# Patient Record
Sex: MALE | Race: OTHER | Employment: FULL TIME | URBAN - METROPOLITAN AREA
[De-identification: names, ages, dates, MRNs, and addresses within clinical notes are randomized per-mention and may not be internally consistent; named-entity substitution may affect disease eponyms.]

---

## 2023-09-27 ENCOUNTER — OFFICE VISIT (OUTPATIENT)
Dept: INTERNAL MEDICINE CLINIC | Facility: CLINIC | Age: 28
End: 2023-09-27
Payer: COMMERCIAL

## 2023-09-27 VITALS
BODY MASS INDEX: 29.7 KG/M2 | OXYGEN SATURATION: 96 % | TEMPERATURE: 98 F | HEART RATE: 55 BPM | SYSTOLIC BLOOD PRESSURE: 100 MMHG | HEIGHT: 68 IN | RESPIRATION RATE: 14 BRPM | DIASTOLIC BLOOD PRESSURE: 60 MMHG | WEIGHT: 196 LBS

## 2023-09-27 DIAGNOSIS — K21.9 GASTROESOPHAGEAL REFLUX DISEASE, UNSPECIFIED WHETHER ESOPHAGITIS PRESENT: ICD-10-CM

## 2023-09-27 DIAGNOSIS — H91.90 SUBJECTIVE HEARING CHANGE: ICD-10-CM

## 2023-09-27 DIAGNOSIS — Z00.00 ANNUAL PHYSICAL EXAM: Primary | ICD-10-CM

## 2023-09-27 PROCEDURE — 3078F DIAST BP <80 MM HG: CPT | Performed by: INTERNAL MEDICINE

## 2023-09-27 PROCEDURE — 3074F SYST BP LT 130 MM HG: CPT | Performed by: INTERNAL MEDICINE

## 2023-09-27 PROCEDURE — 99385 PREV VISIT NEW AGE 18-39: CPT | Performed by: INTERNAL MEDICINE

## 2023-09-27 PROCEDURE — 3008F BODY MASS INDEX DOCD: CPT | Performed by: INTERNAL MEDICINE

## 2023-09-27 PROCEDURE — 99203 OFFICE O/P NEW LOW 30 MIN: CPT | Performed by: INTERNAL MEDICINE

## 2023-09-27 RX ORDER — ZINC GLUCONATE 50 MG
TABLET ORAL
COMMUNITY

## 2023-09-27 RX ORDER — MULTIVIT-MIN/FERROUS FUMARATE 9 MG/15 ML
LIQUID (ML) ORAL
COMMUNITY

## 2023-10-24 ENCOUNTER — OFFICE VISIT (OUTPATIENT)
Dept: FAMILY MEDICINE CLINIC | Facility: CLINIC | Age: 28
End: 2023-10-24

## 2023-10-24 VITALS
WEIGHT: 198 LBS | DIASTOLIC BLOOD PRESSURE: 68 MMHG | SYSTOLIC BLOOD PRESSURE: 128 MMHG | RESPIRATION RATE: 16 BRPM | TEMPERATURE: 97 F | OXYGEN SATURATION: 97 % | BODY MASS INDEX: 30.01 KG/M2 | HEART RATE: 56 BPM | HEIGHT: 68 IN

## 2023-10-24 DIAGNOSIS — J98.8 VIRAL RESPIRATORY INFECTION: Primary | ICD-10-CM

## 2023-10-24 DIAGNOSIS — Z28.21 INFLUENZA VACCINATION DECLINED BY PATIENT: ICD-10-CM

## 2023-10-24 DIAGNOSIS — B97.89 VIRAL RESPIRATORY INFECTION: Primary | ICD-10-CM

## 2023-10-24 PROCEDURE — 3008F BODY MASS INDEX DOCD: CPT | Performed by: STUDENT IN AN ORGANIZED HEALTH CARE EDUCATION/TRAINING PROGRAM

## 2023-10-24 PROCEDURE — 3074F SYST BP LT 130 MM HG: CPT | Performed by: STUDENT IN AN ORGANIZED HEALTH CARE EDUCATION/TRAINING PROGRAM

## 2023-10-24 PROCEDURE — 99213 OFFICE O/P EST LOW 20 MIN: CPT | Performed by: STUDENT IN AN ORGANIZED HEALTH CARE EDUCATION/TRAINING PROGRAM

## 2023-10-24 PROCEDURE — 3078F DIAST BP <80 MM HG: CPT | Performed by: STUDENT IN AN ORGANIZED HEALTH CARE EDUCATION/TRAINING PROGRAM

## 2023-10-24 RX ORDER — BENZONATATE 100 MG/1
100 CAPSULE ORAL 3 TIMES DAILY PRN
Qty: 30 CAPSULE | Refills: 0 | Status: SHIPPED | OUTPATIENT
Start: 2023-10-24 | End: 2023-11-03

## 2023-12-04 ENCOUNTER — PATIENT MESSAGE (OUTPATIENT)
Dept: INTERNAL MEDICINE CLINIC | Facility: CLINIC | Age: 28
End: 2023-12-04

## 2023-12-05 NOTE — TELEPHONE ENCOUNTER
From: Jim Thompson  To: Stella Ferreira  Sent: 12/4/2023 7:04 PM CST  Subject: Vitamin D Test Follow UP    Hi,     I was suppose to take a test back in 9/27/23 to see my levels for Vitamin D. Is it too late to take the test for a follow up? If I do need to take it, what kind of test is it and where can I go? Thanks in advance.

## 2023-12-06 ENCOUNTER — LAB ENCOUNTER (OUTPATIENT)
Dept: LAB | Age: 28
End: 2023-12-06
Attending: INTERNAL MEDICINE
Payer: COMMERCIAL

## 2023-12-06 DIAGNOSIS — Z00.00 ANNUAL PHYSICAL EXAM: ICD-10-CM

## 2023-12-06 LAB
ALBUMIN SERPL-MCNC: 4 G/DL (ref 3.4–5)
ALBUMIN/GLOB SERPL: 1.2 {RATIO} (ref 1–2)
ALP LIVER SERPL-CCNC: 77 U/L
ALT SERPL-CCNC: 35 U/L
ANION GAP SERPL CALC-SCNC: 6 MMOL/L (ref 0–18)
AST SERPL-CCNC: 25 U/L (ref 15–37)
BASOPHILS # BLD AUTO: 0.03 X10(3) UL (ref 0–0.2)
BASOPHILS NFR BLD AUTO: 0.5 %
BILIRUB SERPL-MCNC: 0.7 MG/DL (ref 0.1–2)
BUN BLD-MCNC: 19 MG/DL (ref 9–23)
CALCIUM BLD-MCNC: 9.1 MG/DL (ref 8.5–10.1)
CHLORIDE SERPL-SCNC: 106 MMOL/L (ref 98–112)
CHOLEST SERPL-MCNC: 157 MG/DL (ref ?–200)
CO2 SERPL-SCNC: 29 MMOL/L (ref 21–32)
CREAT BLD-MCNC: 1.55 MG/DL
EGFRCR SERPLBLD CKD-EPI 2021: 61 ML/MIN/1.73M2 (ref 60–?)
EOSINOPHIL # BLD AUTO: 0.43 X10(3) UL (ref 0–0.7)
EOSINOPHIL NFR BLD AUTO: 7.3 %
ERYTHROCYTE [DISTWIDTH] IN BLOOD BY AUTOMATED COUNT: 12.8 %
EST. AVERAGE GLUCOSE BLD GHB EST-MCNC: 111 MG/DL (ref 68–126)
FASTING PATIENT LIPID ANSWER: NO
FASTING STATUS PATIENT QL REPORTED: NO
GLOBULIN PLAS-MCNC: 3.4 G/DL (ref 2.8–4.4)
GLUCOSE BLD-MCNC: 95 MG/DL (ref 70–99)
HBA1C MFR BLD: 5.5 % (ref ?–5.7)
HCT VFR BLD AUTO: 44.4 %
HDLC SERPL-MCNC: 55 MG/DL (ref 40–59)
HGB BLD-MCNC: 15 G/DL
IMM GRANULOCYTES # BLD AUTO: 0.04 X10(3) UL (ref 0–1)
IMM GRANULOCYTES NFR BLD: 0.7 %
LDLC SERPL CALC-MCNC: 87 MG/DL (ref ?–100)
LYMPHOCYTES # BLD AUTO: 2.49 X10(3) UL (ref 1–4)
LYMPHOCYTES NFR BLD AUTO: 42.2 %
MCH RBC QN AUTO: 28.9 PG (ref 26–34)
MCHC RBC AUTO-ENTMCNC: 33.8 G/DL (ref 31–37)
MCV RBC AUTO: 85.5 FL
MONOCYTES # BLD AUTO: 0.54 X10(3) UL (ref 0.1–1)
MONOCYTES NFR BLD AUTO: 9.2 %
NEUTROPHILS # BLD AUTO: 2.37 X10 (3) UL (ref 1.5–7.7)
NEUTROPHILS # BLD AUTO: 2.37 X10(3) UL (ref 1.5–7.7)
NEUTROPHILS NFR BLD AUTO: 40.1 %
NONHDLC SERPL-MCNC: 102 MG/DL (ref ?–130)
OSMOLALITY SERPL CALC.SUM OF ELEC: 294 MOSM/KG (ref 275–295)
PLATELET # BLD AUTO: 220 10(3)UL (ref 150–450)
POTASSIUM SERPL-SCNC: 3.8 MMOL/L (ref 3.5–5.1)
PROT SERPL-MCNC: 7.4 G/DL (ref 6.4–8.2)
RBC # BLD AUTO: 5.19 X10(6)UL
SODIUM SERPL-SCNC: 141 MMOL/L (ref 136–145)
TRIGL SERPL-MCNC: 79 MG/DL (ref 30–149)
TSI SER-ACNC: 2.16 MIU/ML (ref 0.36–3.74)
VIT D+METAB SERPL-MCNC: 22.4 NG/ML (ref 30–100)
VLDLC SERPL CALC-MCNC: 13 MG/DL (ref 0–30)
WBC # BLD AUTO: 5.9 X10(3) UL (ref 4–11)

## 2023-12-06 PROCEDURE — 36415 COLL VENOUS BLD VENIPUNCTURE: CPT

## 2023-12-06 PROCEDURE — 82306 VITAMIN D 25 HYDROXY: CPT

## 2023-12-07 DIAGNOSIS — R79.89 ELEVATED SERUM CREATININE: Primary | ICD-10-CM

## 2024-02-01 ENCOUNTER — E-VISIT (OUTPATIENT)
Dept: TELEHEALTH | Age: 29
End: 2024-02-01
Payer: COMMERCIAL

## 2024-02-01 ENCOUNTER — OFFICE VISIT (OUTPATIENT)
Dept: FAMILY MEDICINE CLINIC | Facility: CLINIC | Age: 29
End: 2024-02-01
Payer: COMMERCIAL

## 2024-02-01 VITALS
WEIGHT: 194.5 LBS | HEIGHT: 68 IN | DIASTOLIC BLOOD PRESSURE: 60 MMHG | TEMPERATURE: 97 F | BODY MASS INDEX: 29.48 KG/M2 | OXYGEN SATURATION: 98 % | SYSTOLIC BLOOD PRESSURE: 120 MMHG | RESPIRATION RATE: 16 BRPM | HEART RATE: 60 BPM

## 2024-02-01 DIAGNOSIS — A09 TRAVELER'S DIARRHEA: Primary | ICD-10-CM

## 2024-02-01 DIAGNOSIS — Z02.9 ADMINISTRATIVE ENCOUNTER: Primary | ICD-10-CM

## 2024-02-01 DIAGNOSIS — K62.5 BRBPR (BRIGHT RED BLOOD PER RECTUM): ICD-10-CM

## 2024-02-01 DIAGNOSIS — K21.9 GASTROESOPHAGEAL REFLUX DISEASE, UNSPECIFIED WHETHER ESOPHAGITIS PRESENT: ICD-10-CM

## 2024-02-01 PROCEDURE — 99214 OFFICE O/P EST MOD 30 MIN: CPT | Performed by: STUDENT IN AN ORGANIZED HEALTH CARE EDUCATION/TRAINING PROGRAM

## 2024-02-01 RX ORDER — NICOTINE POLACRILEX 4 MG/1
1 GUM, CHEWING ORAL DAILY
Qty: 60 TABLET | Refills: 0 | Status: SHIPPED | OUTPATIENT
Start: 2024-02-01 | End: 2024-04-01

## 2024-02-01 RX ORDER — LOPERAMIDE HYDROCHLORIDE 2 MG/1
2 TABLET ORAL 4 TIMES DAILY PRN
Qty: 30 TABLET | Refills: 0 | Status: SHIPPED | OUTPATIENT
Start: 2024-02-01

## 2024-02-01 RX ORDER — AZITHROMYCIN 250 MG/1
500 TABLET, FILM COATED ORAL DAILY
Qty: 6 TABLET | Refills: 0 | Status: SHIPPED | OUTPATIENT
Start: 2024-02-01 | End: 2024-02-04

## 2024-02-01 NOTE — PROGRESS NOTES
Subjective:      Chief Complaint   Patient presents with    Nausea     Upset stomach, minor chills, vomiting, diarrhea with mucus - started Monday - states he just came back from [place] - took peptobismol     HISTORY OF PRESENT ILLNESS  Nausea  Associated symptoms include abdominal pain, nausea and vomiting.     HPI obtained per patient report.  Armando Victor is a pleasant 28 year old male presenting with GI symptoms.   He reports epigastric pain, bloating, and multiple episodes of nonbloody nonmucoid diarrhea since 1/29/24. He initially had nausea and vomiting on 1/29/24 and 1/30/24 but these symptoms have resolved. His symptoms began during a trip to [place], and he returned yesterday. He has been taking peptobismol as needed for his symptoms, but his diarrhea has not been improving.     He also reports chronic intermittent heartburn when eating spicy or greasy foods. He has cut back on his dietary triggers as much as possible, but he still has symptoms more than twice per week due to inability to avoid these foods entirely. He reports occasional BRBPR after eating spicy foods as well.     PAST PATIENT HISTORY  Past Medical History:   Diagnosis Date    Allergic rhinitis 6/1/2000    Esophageal reflux 6/1/2015     History reviewed. No pertinent surgical history.    CURRENT MEDICATIONS  Outpatient Medications Marked as Taking for the 2/1/24 encounter (Office Visit) with Cecilia Camejo MD   Medication Sig Dispense Refill    azithromycin 250 MG Oral Tab Take 2 tablets (500 mg total) by mouth daily for 3 days. 6 tablet 0    Loperamide HCl 2 MG Oral Tab Take 1 tablet (2 mg total) by mouth 4 (four) times daily as needed for Diarrhea. 30 tablet 0    Omeprazole 20 MG Oral Tab EC Take 1 tablet by mouth daily. 60 tablet 0    ASHWAGANDHA OR Take 1,000 mg by mouth.      Cholecalciferol (VITAMIN D3) 30 MCG/15ML Oral Liquid Take by mouth.      Zinc 50 MG Oral Tab Take by mouth.      KRILL OIL OR Take by mouth.      B Complex  Vitamins (B COMPLEX 1 OR) Take by mouth.      Chlorpheniramine Maleate (ALLERGY OR) Take by mouth.         HEALTH MAINTENANCE  Immunization History   Administered Date(s) Administered    None   Deferred Date(s) Deferred    FLUZONE 6 months and older PFS 0.5 ml (51730) 10/24/2023       ALLERGIES AND DRUG REACTIONS  Allergies   Allergen Reactions    Tylenol [Acetaminophen] HIVES       Family History   Problem Relation Age of Onset    Colon Cancer Neg     Breast Cancer Neg     Ovarian Cancer Neg      Social History     Socioeconomic History    Marital status:    Tobacco Use    Smoking status: Never    Smokeless tobacco: Never   Vaping Use    Vaping Use: Never used   Substance and Sexual Activity    Alcohol use: Yes     Alcohol/week: 1.0 standard drink of alcohol     Types: 1 Shots of liquor per week     Comment: minor per patient    Drug use: Yes     Frequency: 2.0 times per week     Types: Cannabis   Other Topics Concern    Caffeine Concern No    Exercise Yes    Seat Belt No    Special Diet Yes    Stress Concern No    Weight Concern No       Review of Systems   Gastrointestinal:  Positive for abdominal distention, abdominal pain, blood in stool, diarrhea, nausea and vomiting.   All other systems reviewed and are negative.         Objective:      /60   Pulse 60   Temp 97.2 °F (36.2 °C) (Temporal)   Resp 16   Ht 5' 8\" (1.727 m)   Wt 194 lb 8 oz (88.2 kg)   SpO2 98%   BMI 29.57 kg/m²   Body mass index is 29.57 kg/m².    Physical Exam  Vitals reviewed.   Constitutional:       General: He is not in acute distress.     Appearance: He is not ill-appearing, toxic-appearing or diaphoretic.   HENT:      Head: Normocephalic and atraumatic.   Cardiovascular:      Rate and Rhythm: Normal rate.   Pulmonary:      Effort: Pulmonary effort is normal.   Abdominal:      General: Abdomen is flat. Bowel sounds are normal. There is no distension.      Palpations: Abdomen is soft. There is no mass.      Tenderness: There  is no abdominal tenderness. There is no guarding or rebound.      Hernia: No hernia is present.   Musculoskeletal:      Cervical back: Neck supple.      Right lower leg: No edema.      Left lower leg: No edema.   Neurological:      Mental Status: He is alert and oriented to person, place, and time.            Assessment and Plan:      1. Traveler's diarrhea (Primary)  -     Azithromycin; Take 2 tablets (500 mg total) by mouth daily for 3 days.  Dispense: 6 tablet; Refill: 0  -     Loperamide HCl; Take 1 tablet (2 mg total) by mouth 4 (four) times daily as needed for Diarrhea.  Dispense: 30 tablet; Refill: 0  2. Gastroesophageal reflux disease, unspecified whether esophagitis present  -     Omeprazole; Take 1 tablet by mouth daily.  Dispense: 60 tablet; Refill: 0  -     GASTRO - INTERNAL  3. BRBPR (bright red blood per rectum)    No follow-ups on file.    Traveler's diarrhea  - diarrhea not improving over time  - recommended azithromycin as prescribed   - discussed that he may take peptobismol and/or imodium as needed for diarrhea    GERD, BRBPR  - possible associated gastritis/PUD  - recommended starting omeprazole as prescribed after resolution of his acute symptoms above  - we discussed that if omeprazole 20mg is insufficient for symptom control, he may take 40mg daily  - if symptoms do not resolve with 8 week course of omeprazole, recommended consulting with GI    Patient verbalized understanding of assessment and recommendations. All questions and concerns were addressed.    Electronically signed by Cecilia Camejo MD

## 2024-02-01 NOTE — PROGRESS NOTES
Patient has appt with PCP this after and requested to cancel this Evisit. Will close out Evisit at no charge to patient.

## 2024-02-13 ENCOUNTER — E-VISIT (OUTPATIENT)
Dept: TELEHEALTH | Age: 29
End: 2024-02-13
Payer: COMMERCIAL

## 2024-02-13 DIAGNOSIS — T50.905A ADVERSE EFFECT OF DRUG, INITIAL ENCOUNTER: ICD-10-CM

## 2024-02-13 DIAGNOSIS — R21 RASH: Primary | ICD-10-CM

## 2024-02-13 PROCEDURE — 99422 OL DIG E/M SVC 11-20 MIN: CPT | Performed by: PHYSICIAN ASSISTANT

## 2024-02-13 NOTE — PROGRESS NOTES
Armando Victor is a 28 year old male who initiated e-visit care today.    HPI:   See answers to questionnaire submission     Current Outpatient Medications   Medication Sig Dispense Refill    Loperamide HCl 2 MG Oral Tab Take 1 tablet (2 mg total) by mouth 4 (four) times daily as needed for Diarrhea. 30 tablet 0    Omeprazole 20 MG Oral Tab EC Take 1 tablet by mouth daily. 60 tablet 0    ASHWAGANDHA OR Take 1,000 mg by mouth.      Cholecalciferol (VITAMIN D3) 30 MCG/15ML Oral Liquid Take by mouth.      Zinc 50 MG Oral Tab Take by mouth.      KRILL OIL OR Take by mouth.      B Complex Vitamins (B COMPLEX 1 OR) Take by mouth.      Chlorpheniramine Maleate (ALLERGY OR) Take by mouth.        Past Medical History:   Diagnosis Date    Allergic rhinitis 6/1/2000    Esophageal reflux 6/1/2015      No past surgical history on file.   Family History   Problem Relation Age of Onset    Colon Cancer Neg     Breast Cancer Neg     Ovarian Cancer Neg       Social History:  Social History     Socioeconomic History    Marital status:    Tobacco Use    Smoking status: Never    Smokeless tobacco: Never   Vaping Use    Vaping Use: Never used   Substance and Sexual Activity    Alcohol use: Yes     Alcohol/week: 1.0 standard drink of alcohol     Types: 1 Shots of liquor per week     Comment: minor per patient    Drug use: Yes     Frequency: 2.0 times per week     Types: Cannabis   Other Topics Concern    Caffeine Concern No    Exercise Yes    Seat Belt No    Special Diet Yes    Stress Concern No    Weight Concern No         ASSESSMENT AND PLAN:       Encounter Diagnoses   Name Primary?    Rash Yes    Adverse effect of drug, initial encounter      Patient with reported hives. Patient did not sent photo despite request as \"was unable to take\" photos at that time.  Patient reports hx of hives with acetaminophen, listed as possible allergy on chart.  Patient reports taking Dayquil XL-3 and acetaminophen recently.  Now having possible  hives.  Denies tongue/facial swelling, denies SOB/dyspnea/wheezing/dysphagia, denies NV.  Advised avoidance.  Counseled on seeking care emergently at ED for any of the above symptoms.     Advised Zyrtec/Xyzal BID and Pepcid BID for next several days.       Duration of  the service:  18 minutes      See [employer] message exchange and Patient Instructions for Comfort Care and patient education.

## 2024-10-21 ENCOUNTER — OFFICE VISIT (OUTPATIENT)
Facility: LOCATION | Age: 29
End: 2024-10-21
Payer: COMMERCIAL

## 2024-10-21 DIAGNOSIS — F52.4 PREMATURE EJACULATION: ICD-10-CM

## 2024-10-21 DIAGNOSIS — L81.9 HYPOPIGMENTATION: ICD-10-CM

## 2024-10-21 DIAGNOSIS — Q55.69 SHORT FRENULUM OF PENIS: ICD-10-CM

## 2024-10-21 DIAGNOSIS — N52.9 ERECTILE DYSFUNCTION, UNSPECIFIED ERECTILE DYSFUNCTION TYPE: Primary | ICD-10-CM

## 2024-10-21 DIAGNOSIS — R82.90 URINE FINDING: ICD-10-CM

## 2024-10-21 LAB
APPEARANCE: CLEAR
BILIRUBIN: NEGATIVE
GLUCOSE (URINE DIPSTICK): NEGATIVE MG/DL
KETONES (URINE DIPSTICK): NEGATIVE MG/DL
LEUKOCYTES: NEGATIVE
MULTISTIX LOT#: NORMAL NUMERIC
NITRITE, URINE: NEGATIVE
OCCULT BLOOD: NEGATIVE
PH, URINE: 6.5 (ref 4.5–8)
PROTEIN (URINE DIPSTICK): NEGATIVE MG/DL
SPECIFIC GRAVITY: >=1.03 (ref 1–1.03)
URINE-COLOR: YELLOW
UROBILINOGEN,SEMI-QN: 0.2 MG/DL (ref 0–1.9)

## 2024-10-21 RX ORDER — TADALAFIL 20 MG/1
20 TABLET ORAL
Qty: 30 TABLET | Refills: 5 | Status: SHIPPED | OUTPATIENT
Start: 2024-10-21

## 2024-10-21 NOTE — PROGRESS NOTES
[facility] MEDICAL [facility], [address] [place] [address]    Urology Consult Note    History of Present Illness:   Patient is a 29 year old male with hx of allergies, GERD, who presents today for consultation from Dr. Camejo's office for erectile dysfunction.     Duration: ~1 year  Difficulty in maintaining / achieving an erection.  Able to complete sexual activity variable  Libido and sexual desire: normal  Morningtime erections: normal  Penile Bending: slight, no difficulties penetrating  Medical therapies tried: none  Premature ejaculation: yes, question of whether this is true underlying issue  Genital trauma: uncircumcised, 1 year ago was wearing condom and felt like something tore and had bleeding, does not believe was at the meatus - feels like this was the onset of his symptoms.  Does note better results with self stimulation than when with partner    Testing performed to date 12/2023 that were normal.    Any current new stress: ++  Other new medications: none  Smoking hx: few  Supplement hx: as noted in med list  Alcohol or drug use: EtOH 1/week      HISTORY:  Past Medical History:    Allergic rhinitis    Esophageal reflux      No past surgical history on file.   Family History   Problem Relation Age of Onset    Colon Cancer Neg     Breast Cancer Neg     Ovarian Cancer Neg       Social History:   Social History     Socioeconomic History    Marital status:    Tobacco Use    Smoking status: Never    Smokeless tobacco: Never   Vaping Use    Vaping status: Never Used   Substance and Sexual Activity    Alcohol use: Yes     Alcohol/week: 1.0 standard drink of alcohol     Types: 1 Shots of liquor per week     Comment: minor per patient    Drug use: Yes     Frequency: 2.0 times per week     Types: Cannabis   Other Topics Concern    Caffeine Concern No    Exercise Yes    Seat Belt No    Special Diet Yes    Stress Concern No    Weight Concern No     Social Drivers of Health      Received from [employer],  [facility]        Allergies  Allergies[1]    Review of Systems:   A 10-point review of systems was completed and is negative other than as noted above.    Physical Exam:   There were no vitals taken for this visit.    GENERAL APPEARANCE: well developed, well nourished, in no acute distress  NEUROLOGIC: no localizing neurologic signs, alert and oriented x 3, converses appropriately  HEAD: atraumatic, normocephalic  EYES: sclera non-icteric  ORAL CAVITY: mucosa moist  NECK/THYROID: no obvious masses or goiter  LUNGS: non-labored breathing  ABDOMEN: soft, nontender, nondistended  CVA: no CVA tenderness  INGUINAL CANALS: no hernias  PENILE MEATUS: open and in normal location  PENIS normal, uncircumcised short frenulum  SCROTUM: normal  no varicocele  TESTES: normal anatomy  EPIDIDYMIS: normal anatomy  EXTREMITIES: warm, well-perfused. No clubbing, cyanosis or edema.  SKIN: area of hypopigmentation on ventral aspect of glans and to corona, superficial vessels noted    Results:     Laboratory Data:  Lab Results   Component Value Date    WBC 5.9 12/06/2023    HGB 15.0 12/06/2023    .0 12/06/2023     Lab Results   Component Value Date     12/06/2023    K 3.8 12/06/2023     12/06/2023    CO2 29.0 12/06/2023    BUN 19 12/06/2023    GLU 95 12/06/2023    AST 25 12/06/2023    ALT 35 12/06/2023    TP 7.4 12/06/2023    ALB 4.0 12/06/2023    CA 9.1 12/06/2023       Urinalysis Results (last three years):  No results for input(s)  in the last 3 years    Urine Culture Results (last three years):  No results found for: \"URINECUL\"    Imaging  No results found.      Impression:     Patient is a 29 year old male with hx of allergies, GERD, who presents today for consultation from Dr. Camejo's office for erectile dysfunction.     Discussed nature of sexual dysfunction with patient.  Encouraged to exercise and promoted healthy diet.  We reviewed that the cause of erectile dysfunction can be multifactorial  and include vascular, diabetes, endocrine, psychologic, medication, and iatrogenic causes.   He should maintain healthy blood pressure, cholesterol, and blood sugars.  Reviewed prior labs WNL, recommend repeat in December 2024 with PCP  Oral phosphodiesterase inhibitor therapy discussed with patient, including proper use of medication and potential risks/side effects such as headache, flushing, dyspepsia, vision effects, muscle aches.  Option of 5mg daily vs 20mg prn reviewed, patient prefers prn.    We discussed premature ejaculation as another symptom, although unclear % of PE vs loss of erection due to inability to maintain. We will trial tadalafil as noted above. We discussed consideration for SSRI in future if ongoing issue.    In regards to shortened frenulum we discussed potential for frenulectomy. At this time would NOT recommend surgical intervention since he has no pain with erections and no ongoing bleeding with erections. Recommend follow up with urologist if this becomes an issue.    Finally we reviewed the hypopigmentation noted on examination. It is unclear whether this is something new or his baseline. Reassurance was given that no concerning features but with any acute changes often refer to dermatology. Will monitor at this time.     Recommendations:  As above. Trial of tadalafil 20mg prn. Labs in December with PCP.    Thank you very much for this consult. Please call if there are any questions or concerns.     Luba Nunn PA-C  Urology  [facility]    Date: 10/21/2024           [1]   Allergies  Allergen Reactions    Tylenol [Acetaminophen] HIVES

## 2024-10-21 NOTE — PATIENT INSTRUCTIONS
Dr. Kaur if worsening symptoms.  Trial tadalafil 5-20mg as needed.     Additional information for your review:    Premature Ejaculation    Premature Ejaculation (PE) is a condition in which sexual climax occurs before, upon, or shortly after initiation of penile stimulation, prior to one’s desire to do so, with minimal voluntary control. It is a common form of male sexual dysfunction.    The key features are:  - Brief time to ejaculation (often less than one minute)  - Lack of control over ejaculation  - Sexual dissatisfaction, distress and frustration of either the male or the male's partner    What Causes Premature Ejaculation?    PE can be psychological and/or biological and can occur because of over-sensitive genital skin, hyperactive reflexes, extreme arousal or infrequent sexual activity. Other factors are genetics, guilt, fear, performance anxiety, inflammation and/or infection of the prostate or urethra and also can be related to the use of alcohol or other substances.    PE occurs in up to 30% of men at some point in their life, involving all ages, ethnicities, and socio-economic groups. PE can cause embarrassment, frustration and loss of self-confidence and can be devastating to a relationship. It is very typical among men during their earliest sexual experiences.    PE can be lifelong or acquired and sometimes occurs on a situational basis. Lifelong PE is thought to have a strong biological component. Acquired PE can be biological, based on inflammation/infection of the reproductive tract or psychological, based upon situational stressors. PE can sometimes be related to Erectile Dysfunction (ED), with the rapid ejaculation brought on by the desire to climax before losing the erection.    Emphasis on ejaculation as the focal point of sexual intercourse tends to increase the performance anxiety that can initiate the problem. Once PE has occurred and established itself, fear of and mental preoccupation  with the issue can actually induce the unwanted rapid ejaculation, creating a vicious cycle.    Treatment Tactics to Overcome Premature Ejaculation    Behavioral Techniques:  1. Squeeze (pause-squeeze) technique: When ejaculation is imminent, the male stops sexual activity and squeezes the glans penis until the urge to ejaculate ceases. He then resumes sexual activity.  2. Start-stop technique: Penile stimulation is stopped when ejaculation is imminent. Stimulatino is resumed when the urge to ejaculate ceases.  3. Quiet vagina: the female stops moving when her partner approaches climax. Movement is resumed when the urge to ejaculate ceases.  4. More foreplay, oral, manual stimulation for the partner. There is no need for sexual activity for the partner to stop once ejaculation occurs.   5. Reducing anxiety during intercourse. Try to take the pressure off yourself and not worry when you ejaculate. Worrying about this can often make premature ejaculation worse. By focusing on your partner's enjoyment this may take some of the pressure off yourself which may help prolong ejaculation.    Non-Behavioral/Medication Options:  Another option is decreasing penile sensitivity with climax-controlled condoms. These have topical anesthetic on the male side to decrease sensation. Regular condoms can also help decrease sensitivity and may help delay ejaculation.    Your doctor may discuss prescription medications to help. These may include:  - Medications that have been shown to delay orgasm (ex: paroxetine, fluoxetine, sertraline, clomipramine)  - Medications that can help maintain erections (ex: viagra, cialis)

## 2024-11-25 ENCOUNTER — OFFICE VISIT (OUTPATIENT)
Dept: INTERNAL MEDICINE CLINIC | Facility: CLINIC | Age: 29
End: 2024-11-25
Payer: COMMERCIAL

## 2024-11-25 VITALS
SYSTOLIC BLOOD PRESSURE: 130 MMHG | BODY MASS INDEX: 31.37 KG/M2 | TEMPERATURE: 98 F | OXYGEN SATURATION: 95 % | DIASTOLIC BLOOD PRESSURE: 64 MMHG | HEIGHT: 68 IN | HEART RATE: 62 BPM | WEIGHT: 207 LBS

## 2024-11-25 DIAGNOSIS — K21.9 GASTROESOPHAGEAL REFLUX DISEASE, UNSPECIFIED WHETHER ESOPHAGITIS PRESENT: ICD-10-CM

## 2024-11-25 DIAGNOSIS — N52.9 ERECTILE DYSFUNCTION, UNSPECIFIED ERECTILE DYSFUNCTION TYPE: ICD-10-CM

## 2024-11-25 DIAGNOSIS — H91.90 SUBJECTIVE HEARING LOSS: ICD-10-CM

## 2024-11-25 DIAGNOSIS — R09.89 RHONCHI AT LEFT LUNG BASE: ICD-10-CM

## 2024-11-25 DIAGNOSIS — Z00.00 ANNUAL PHYSICAL EXAM: Primary | ICD-10-CM

## 2024-11-25 RX ORDER — OMEPRAZOLE 40 MG/1
40 CAPSULE, DELAYED RELEASE ORAL DAILY PRN
Qty: 90 CAPSULE | Refills: 0 | Status: SHIPPED | OUTPATIENT
Start: 2024-11-25

## 2024-11-25 NOTE — PROGRESS NOTES
Subjective:   Armando Victor is a 29 year old male  who presents for Physical    And the following.     Reports doing well overall but sometimes has  sour taste and heartburn at times.     Denies immediate family hx of breast or colon or prostate cancer.    Also reports difficulty maintaining erection if glans has too much friction. Has seen urology. Interested in second opinion.     Recovering from URI.   No acute symptoms     Also reports subjective R hearing loss  Remainder of negative   History/Other:    Chief Complaint Reviewed and Verified  No Further Nursing Notes to   Review  Tobacco Reviewed  Allergies Reviewed  Medications Reviewed    Medical History Reviewed  Surgical History Reviewed  Family History   Reviewed  Social History Reviewed         Current Outpatient Medications   Medication Sig Dispense Refill    Tadalafil 20 MG Oral Tab Take 1 tablet (20 mg total) by mouth daily as needed for Erectile Dysfunction. 30 tablet 5    Cholecalciferol (VITAMIN D3) 30 MCG/15ML Oral Liquid Take by mouth.      Zinc 50 MG Oral Tab Take by mouth.      KRILL OIL OR Take by mouth.      B Complex Vitamins (B COMPLEX 1 OR) Take by mouth.      Chlorpheniramine Maleate (ALLERGY OR) Take by mouth.         Review of Systems:  Pertinent items are noted in HPI.  A comprehensive 10 point review of systems was completed.  Pertinent positives and negatives noted in the the HPI.        Objective:   /64 (BP Location: Left arm, Patient Position: Sitting, Cuff Size: adult)   Pulse 62   Temp 97.9 °F (36.6 °C) (Temporal)   Ht 5' 8\" (1.727 m)   Wt 207 lb (93.9 kg)   SpO2 95%   BMI 31.47 kg/m²  Estimated body mass index is 31.47 kg/m² as calculated from the following:    Height as of this encounter: 5' 8\" (1.727 m).    Weight as of this encounter: 207 lb (93.9 kg).  PHYSICAL EXAM:   General: no acute distress   Eyes: pupils equal and reactive; EOMI; sclera normal; conjunctiva normal   ENT: without erythema or  exudate  Neck: trachea midline; no adenopathy; thyroid not enlarged   Hematologic/lymphatic:no cervical lymphadenopathy; no supraclavicular adenopathy   Respiratory: no increased work of breathing; good air exchange; no crackles or wheezing but L base rhonchi  Cardiovascular: RRR; S1, S2; no murmurs; no edema   Gastrointestinal: normal bowel sounds; soft; non-distended; non-tender  Neurological: awake, alert, oriented x3; CNII-XII grossly intact;  MSK: full ROM; strength 5/5  Behavioral/Psych: euthymic; appropriate affect       Assessment & Plan:   Armando was seen today for physical.    Diagnoses and all orders for this visit:    Annual physical exam  -     Vitamin D; Future  -     CBC With Differential With Platelet  -     Comp Metabolic Panel (14)  -     Hemoglobin A1C  -     TSH W Reflex To Free T4  -     Lipid Panel    Gastroesophageal reflux disease, unspecified whether esophagitis present  -  avoid trigger food.  -trial of   Omeprazole 40 MG Oral Capsule Delayed Release; Take 1 capsule (40 mg total) by mouth daily as needed.    Rhonchi at left lung base  -     XR CHEST PA + LAT CHEST (CPT=71046); Future    Subjective hearing loss  -     Audiology Referral - In Network    Erectile dysfunction, unspecified erectile dysfunction type  -     Urology Referral - In Network    Other orders  -     TdaP (Adacel, Boostrix) [[id number]]              Tanika Merchant MD

## 2024-11-27 ENCOUNTER — HOSPITAL ENCOUNTER (OUTPATIENT)
Dept: GENERAL RADIOLOGY | Age: 29
Discharge: HOME | End: 2024-11-27
Attending: INTERNAL MEDICINE
Payer: COMMERCIAL

## 2024-11-27 DIAGNOSIS — R09.89 RHONCHI AT LEFT LUNG BASE: ICD-10-CM

## 2024-11-27 PROCEDURE — 71046 X-RAY EXAM CHEST 2 VIEWS: CPT | Performed by: INTERNAL MEDICINE

## 2025-01-22 ENCOUNTER — VIRTUAL PHONE E/M (OUTPATIENT)
Dept: SURGERY | Facility: CLINIC | Age: 30
End: 2025-01-22

## 2025-01-22 DIAGNOSIS — Q55.69 SHORT FRENULUM OF PENIS: Primary | ICD-10-CM

## 2025-01-22 PROCEDURE — 98012 SYNCH AUDIO-ONLY EST SF 10: CPT | Performed by: PHYSICIAN ASSISTANT

## 2025-01-22 NOTE — PROGRESS NOTES
Subjective:   Virtual Telephone Check-In    Armando Victor verbally consents to a Virtual Telephone Check-In service on 01/22/25.  Patient understands and accepts financial responsibility for any deductible, co-insurance and/or co-pays associated with this service.    Duration of the service: 10 minutes    Summary of topics discussed:  See below    Patient is a 29 year old male with hx of allergies, GERD, who presents today for follow up ED.    Seen in October 2024 for same. Recommended check labs for organic cause of ED and provided script for trial of tadalafil 10-20mg prn.     Patient rarely uses the medication.  Has been feeling some numbness near the penile frenulum.  During intercourse wears a condom. If he does not use lubrication develops some bleeding from the frenulum. No issues when he uses lubrication in the condom.  On examination last year did note a short frenulum and discussed consideration for surgery but given no bleeding at that time and no pain with erections recommended observation only. He has since developed bleeding and notes that when this happens he loses his erection.     PRIOR NOTE:     Duration: ~1 year  Difficulty in maintaining / achieving an erection.  Able to complete sexual activity variable  Libido and sexual desire: normal  Morningtime erections: normal  Penile Bending: slight, no difficulties penetrating  Medical therapies tried: none  Premature ejaculation: yes, question of whether this is true underlying issue  Genital trauma: uncircumcised, 1 year ago was wearing condom and felt like something tore and had bleeding, does not believe was at the meatus - feels like this was the onset of his symptoms.  Does note better results with self stimulation than when with partner     Testing performed to date 12/2023 that were normal.     Any current new stress: ++  Other new medications: none  Smoking hx: few  Supplement hx: as noted in med list  Alcohol or drug use: EtOH  1/week  History/Other:    No Further Nursing Notes to Review         Current Outpatient Medications   Medication Sig Dispense Refill    Omeprazole 40 MG Oral Capsule Delayed Release Take 1 capsule (40 mg total) by mouth daily as needed. 90 capsule 0    Tadalafil 20 MG Oral Tab Take 1 tablet (20 mg total) by mouth daily as needed for Erectile Dysfunction. 30 tablet 5    Cholecalciferol (VITAMIN D3) 30 MCG/15ML Oral Liquid Take by mouth.      Zinc 50 MG Oral Tab Take by mouth.      KRILL OIL OR Take by mouth.      B Complex Vitamins (B COMPLEX 1 OR) Take by mouth.      Chlorpheniramine Maleate (ALLERGY OR) Take by mouth.         Review of Systems:  Pertinent items are noted in HPI.      Objective:   There were no vitals taken for this visit. Estimated body mass index is 31.47 kg/m² as calculated from the following:    Height as of 11/25/24: 5' 8\" (1.727 m).    Weight as of 11/25/24: 207 lb (93.9 kg).    GENERAL: in no acute distress  NEUROLOGIC: alert and oriented, normal speech  LUNGS: nonlabored breathing      Laboratory Data:  Lab Results   Component Value Date    WBC 5.9 12/06/2023    HGB 15.0 12/06/2023    .0 12/06/2023     Lab Results   Component Value Date     12/06/2023    K 3.8 12/06/2023     12/06/2023    CO2 29.0 12/06/2023    BUN 19 12/06/2023    GLU 95 12/06/2023    AST 25 12/06/2023    ALT 35 12/06/2023    TP 7.4 12/06/2023    ALB 4.0 12/06/2023    CA 9.1 12/06/2023       Urinalysis Results (last three years):  Recent Labs     10/21/24  1726   SPECGRAVITY >=1.030   PHURINE 6.5   NITRITE Negative       Urine Culture Results (last three years):  No results found for: \"URINECUL\"    Imaging  No results found.    Assessment & Plan:   Short frenulum  Penile bleeding/trauma    Recommend in office evaluation with Dr. Kaur or Dr. Rosado. Offered visit next week, declined, he will call back to schedule.       Luba Nunn PA-C, 1/22/2025

## 2025-08-04 ENCOUNTER — OCC HEALTH (OUTPATIENT)
Dept: OCCUPATIONAL MEDICINE | Age: 30
End: 2025-08-04
Attending: PHYSICIAN ASSISTANT

## 2025-08-15 DIAGNOSIS — K21.9 GASTROESOPHAGEAL REFLUX DISEASE, UNSPECIFIED WHETHER ESOPHAGITIS PRESENT: ICD-10-CM

## 2025-08-18 RX ORDER — OMEPRAZOLE 40 MG/1
40 CAPSULE, DELAYED RELEASE ORAL DAILY PRN
Qty: 90 CAPSULE | Refills: 0 | Status: SHIPPED | OUTPATIENT
Start: 2025-08-18